# Patient Record
Sex: MALE | Race: AMERICAN INDIAN OR ALASKA NATIVE
[De-identification: names, ages, dates, MRNs, and addresses within clinical notes are randomized per-mention and may not be internally consistent; named-entity substitution may affect disease eponyms.]

---

## 2022-01-04 ENCOUNTER — HOSPITAL ENCOUNTER (EMERGENCY)
Dept: HOSPITAL 5 - ED | Age: 42
LOS: 1 days | Discharge: HOME | End: 2022-01-05
Payer: SELF-PAY

## 2022-01-04 DIAGNOSIS — I10: ICD-10-CM

## 2022-01-04 DIAGNOSIS — Z85.47: ICD-10-CM

## 2022-01-04 DIAGNOSIS — R07.89: ICD-10-CM

## 2022-01-04 DIAGNOSIS — G43.009: ICD-10-CM

## 2022-01-04 DIAGNOSIS — R42: Primary | ICD-10-CM

## 2022-01-04 DIAGNOSIS — E11.9: ICD-10-CM

## 2022-01-04 DIAGNOSIS — R11.2: ICD-10-CM

## 2022-01-04 DIAGNOSIS — R50.9: ICD-10-CM

## 2022-01-04 LAB
ALBUMIN SERPL-MCNC: 4.3 G/DL (ref 3.9–5)
ALT SERPL-CCNC: 34 UNITS/L (ref 7–56)
BASOPHILS # (AUTO): 0 K/MM3 (ref 0–0.1)
BASOPHILS NFR BLD AUTO: 0.9 % (ref 0–1.8)
BUN SERPL-MCNC: 13 MG/DL (ref 9–20)
BUN/CREAT SERPL: 14 %
CALCIUM SERPL-MCNC: 8.4 MG/DL (ref 8.4–10.2)
EOSINOPHIL # BLD AUTO: 0.1 K/MM3 (ref 0–0.4)
EOSINOPHIL NFR BLD AUTO: 1.8 % (ref 0–4.3)
HCT VFR BLD CALC: 43.9 % (ref 35.5–45.6)
HEMOLYSIS INDEX: 9
HGB BLD-MCNC: 14.2 GM/DL (ref 11.8–15.2)
LYMPHOCYTES # BLD AUTO: 0.9 K/MM3 (ref 1.2–5.4)
LYMPHOCYTES NFR BLD AUTO: 22.9 % (ref 13.4–35)
MCHC RBC AUTO-ENTMCNC: 32 % (ref 32–34)
MCV RBC AUTO: 87 FL (ref 84–94)
MONOCYTES # (AUTO): 0.5 K/MM3 (ref 0–0.8)
MONOCYTES % (AUTO): 13 % (ref 0–7.3)
PLATELET # BLD: 180 K/MM3 (ref 140–440)
RBC # BLD AUTO: 5.04 M/MM3 (ref 3.65–5.03)

## 2022-01-04 PROCEDURE — 96360 HYDRATION IV INFUSION INIT: CPT

## 2022-01-04 PROCEDURE — 99284 EMERGENCY DEPT VISIT MOD MDM: CPT

## 2022-01-04 PROCEDURE — 80053 COMPREHEN METABOLIC PANEL: CPT

## 2022-01-04 PROCEDURE — 84484 ASSAY OF TROPONIN QUANT: CPT

## 2022-01-04 PROCEDURE — 36415 COLL VENOUS BLD VENIPUNCTURE: CPT

## 2022-01-04 PROCEDURE — 71045 X-RAY EXAM CHEST 1 VIEW: CPT

## 2022-01-04 PROCEDURE — 93005 ELECTROCARDIOGRAM TRACING: CPT

## 2022-01-04 PROCEDURE — 83880 ASSAY OF NATRIURETIC PEPTIDE: CPT

## 2022-01-04 PROCEDURE — 85025 COMPLETE CBC W/AUTO DIFF WBC: CPT

## 2022-01-04 NOTE — EMERGENCY DEPARTMENT REPORT
ED General Adult HPI





- General


Chief complaint: Nausea/Vomiting/Diarrhea


Stated complaint: MEDICAL CLERANCE


PUI?: Yes


Source: patient


Mode of arrival: Ambulatory


Limitations: No Limitations





- History of Present Illness


Initial comments: 





Patient is a 41-year-old -American male with a history of hypertension, 

hyperlipidemia, testicular cancer in remission, and non-insulin-dependent 

diabetes who presented to the ED with complaint of acute onset persistent severe

headache, lightheadedness and dizziness, nausea and vomiting for the last 2 

days.  Patient states that he ran out of his medications about a week ago and 

has not been able to take his medications for hypertension and 

non-insulin-dependent diabetes as well as hyperlipidemia.  Patient also 

complains of right-sided chest pain which he describes as tightness.  Patient 

states that his symptoms have been worsening especially in the last 24 hours and

is To the ED for evaluation.  Patient denies syncope, palpitations, shortness of

breath, cough, sore throat, nasal and sinus congestion, abdominal pain, 

diarrhea, dysuria, urinary frequency and urgency or change in vision.


MD Complaint: dizziness, severe headache, generalized weakness, right-sided 

chest pain


-: Sudden, days(s) (2)


Location: head, chest


Radiation: non-radiation


Severity scale (0 -10): 3


Quality: aching, dull


Consistency: constant


Improves with: none


Worsens with: none


Associated Symptoms: denies other symptoms, chest pain (right-sided), 

fever/chills, headaches, loss of appetite, malaise, nausea/vomiting, weakness.  

denies: confusion, cough, diaphoresis, rash, seizure, shortness of breath, 

syncope


Treatments Prior to Arrival: none





- Related Data


                                  Previous Rx's











 Medication  Instructions  Recorded  Last Taken  Type


 


AtorvaSTATin [Lipitor] 20 mg PO QHS #30 tab 22 Unknown Rx


 


Butalb/Acetamin/Caff -40 1 - 2 tab PO Q6HR PRN #15 tab 22 Unknown Rx





[Fioricet -40]    


 


Ibuprofen [Motrin] 800 mg PO Q8HR PRN #30 tablet 22 Unknown Rx


 


Ondansetron [Zofran Odt] 4 mg PO Q6HR PRN #24 tab.rapdis 22 Unknown Rx


 


Spironolactone [Aldactone] 25 mg PO QDAY #30 tablet 22 Unknown Rx


 


carvediloL [Coreg] 25 mg PO BID #60 tablet 22 Unknown Rx


 


glipiZIDE [Glucotrol] 5 mg PO QDAY #30 tablet 22 Unknown Rx


 


metFORMIN [Glucophage] 850 mg PO BID #60 tablet 22 Unknown Rx











                                    Allergies











Allergy/AdvReac Type Severity Reaction Status Date / Time


 


No Known Allergies Allergy   Verified 22 22:10














ED Review of Systems


ROS: 


Stated complaint: MEDICAL CLERANCE


Other details as noted in HPI





Constitutional: chills, fever, malaise, weakness


Eyes: denies: eye pain, eye discharge, vision change


ENT: congestion.  denies: ear pain, throat pain


Respiratory: cough.  denies: shortness of breath, wheezing


Cardiovascular: chest pain (Right-sided).  denies: palpitations


Endocrine: no symptoms reported


Gastrointestinal: nausea, vomiting.  denies: abdominal pain, diarrhea


Genitourinary: denies: urgency, dysuria


Musculoskeletal: myalgia.  denies: back pain, joint swelling, arthralgia


Skin: denies: rash, lesions


Neurological: headache, weakness, other (Dizziness and lightheadedness).  

denies: paresthesias


Psychiatric: denies: anxiety, depression


Hematological/Lymphatic: denies: easy bleeding, easy bruising





ED Past Medical Hx





- Past Medical History


Hx Hypertension: Yes


Hx Diabetes: Yes


Hx of Cancer: Yes (testicular)





- Surgical History


Past Surgical History?: No





- Medications


Home Medications: 


                                Home Medications











 Medication  Instructions  Recorded  Confirmed  Last Taken  Type


 


AtorvaSTATin [Lipitor] 20 mg PO QHS #30 tab 22  Unknown Rx


 


Butalb/Acetamin/Caff -40 1 - 2 tab PO Q6HR PRN #15 tab 22  Unknown 

Rx





[Fioricet -40]     


 


Ibuprofen [Motrin] 800 mg PO Q8HR PRN #30 tablet 22  Unknown Rx


 


Ondansetron [Zofran Odt] 4 mg PO Q6HR PRN #24 tab.rapdis 22  Unknown Rx


 


Spironolactone [Aldactone] 25 mg PO QDAY #30 tablet 22  Unknown Rx


 


carvediloL [Coreg] 25 mg PO BID #60 tablet 22  Unknown Rx


 


glipiZIDE [Glucotrol] 5 mg PO QDAY #30 tablet 22  Unknown Rx


 


metFORMIN [Glucophage] 850 mg PO BID #60 tablet 22  Unknown Rx














ED Physical Exam





- General


Limitations: No Limitations


General appearance: alert, in no apparent distress





- Head


Head exam: Present: atraumatic, normocephalic, normal inspection





- Eye


Eye exam: Present: normal appearance, PERRL, EOMI





- ENT


ENT exam: Present: normal exam, normal orophraynx, mucous membranes moist, TM's 

normal bilaterally, normal external ear exam





- Neck


Neck exam: Present: normal inspection, full ROM





- Respiratory


Respiratory exam: Present: normal lung sounds bilaterally.  Absent: respiratory 

distress, wheezes, rales, chest wall tenderness, accessory muscle use, decreased

 breath sounds, prolonged expiratory





- Cardiovascular


Cardiovascular Exam: Present: normal rhythm, tachycardia, normal heart sounds.  

Absent: systolic murmur, diastolic murmur, rubs, gallop





- GI/Abdominal


GI/Abdominal exam: Present: soft, normal bowel sounds.  Absent: tenderness, 

guarding, rebound, hyperactive bowel sounds, hypoactive bowel sounds, 

organomegaly





- Extremities Exam


Extremities exam: Present: normal inspection, full ROM, normal capillary refill





- Back Exam


Back exam: Present: normal inspection, full ROM.  Absent: tenderness, CVA 

tenderness (R), CVA tenderness (L), muscle spasm, paraspinal tenderness, vertebr

al tenderness





- Neurological Exam


Neurological exam: Present: alert, oriented X3, CN II-XII intact, normal gait, 

reflexes normal





- Psychiatric


Psychiatric exam: Present: normal affect, normal mood





- Skin


Skin exam: Present: warm, dry, intact, normal color.  Absent: rash





ED Course


                                   Vital Signs











  22





  22:05


 


Temperature 100.0 F H


 


Pulse Rate 107 H


 


Respiratory 19





Rate 


 


Blood Pressure 152/100


 


O2 Sat by Pulse 98





Oximetry 














ED Medical Decision Making





- Lab Data


Result diagrams: 


                                 22 22:39





                                 22 22:39





- Radiology Data


Radiology results: report reviewed, image reviewed





[facility]  


                                     [address] [place] [address]  


 


                                            XRay Report   


                                               Signed  


 


Patient: MAGALIE ARNETT                                                           

     MR#: [id number]  


 


: 1980                                                                

Acct:[id number]      


 


Age/Sex: 41 / M                                                                

ADM Date: 22     


 


Loc: ED       


Attending Dr:   


 


 


Ordering Physician: DANIELLE SAVAGE  


Date of Service: 22  


Procedure(s): XR chest 1V ap  


Accession Number(s): [id number]  


 


cc: DANIELLE SAVAGE   


 


Fluoro Time In Minutes:   


 


CHEST 1 VIEW   


 


 INDICATION / CLINICAL INFORMATION:  


 chest pain.  


 


 COMPARISON:   


 None available.  


 


 FINDINGS:  


 


 SUPPORT DEVICES: None.  


 


 HEART / MEDIASTINUM: No significant abnormality.   


 


 LUNGS / PLEURA: No significant pulmonary or pleural abnormality. No 

pneumothorax.   


 


 ADDITIONAL FINDINGS: No significant additional findings.  


 


 IMPRESSION:  


 1. No acute findings.  


 


 Signer Name: Ivania Colin MD   


 Signed: 2022 10:48 PM  


 Workstation Name: [id number]   


 


 


Transcribed By:   


Dictated By: Ivania Colin MD  


Electronically Authenticated By: Ivania Colin MD    


Signed Date/Time: 22                                


 


 


 


DD/DT: 22                                                            

  


TD/TT:





























- Medical Decision Making





This is a 41-year-old -American male with a history of hypertension, h

yperlipidemia, testicular cancer in remission, and non-insulin-dependent 

diabetes who presented to the ED with complaint of acute onset persistent severe

 headache, lightheadedness and dizziness, nausea and vomiting for the last 2 

days.  Patient states that he ran out of his medications about a week ago and 

has not been able to take his medications for hypertension and 

non-insulin-dependent diabetes as well as hyperlipidemia.  Patient also 

complains of right-sided chest pain which he describes as tightness.  Patient 

states that his symptoms have been worsening especially in the last 24 hours and

 came to the ED for evaluation.  In the ED, patient is alert and oriented x3 and

 is not in any distress but tachycardic and febrile in triage.  Patient was 

treated for fever in the ED, also given antiemetics, normal saline 1 L IV bolus 

x1.  Chest x-ray showed no acute cardiopulmonary abnormalities or pneumonitis.  

Lab test results were reviewed and are all nonactionable including initial and 

3-hour repeat troponin levels.  EKG shows normal sinus rhythm with no ST or T 

wave abnormalities.  Patient's heart score is 3.  On reevaluation, patient felt 

better, patient was discharged home on medications and advised to follow-up with

 his primary care physician in 5 to 7 days for reevaluation.  Patient was 

advised return to the ED immediately if symptoms get worse.





- Differential Diagnosis


Dehydration; pneumonia; COVID-19; migraine headaches; hyperglycemia


Critical care attestation.: 


If time is entered above; I have spent that time in minutes in the direct care 

of this critically ill patient, excluding procedure time.








ED Disposition


Clinical Impression: 


 Dizziness and giddiness, Nausea and vomiting in adult patient, Fever and chills





Migraine headache without aura


Qualifiers:


 Status migrainosus presence: without status migrainosus Intractability: not 

intractable Qualified Code(s): G43.009 - Migraine without aura, not intractable,

 without status migrainosus





Disposition:  HOME / SELF CARE / HOMELESS


Is pt being admited?: No


Does the pt Need Aspirin: No


Condition: Stable


Instructions:  Nausea and Vomiting, Adult, Easy-to-Read, Migraine Headache, 

Easy-to-Read, Dizziness, Easy-to-Read, Fever, Adult, Easy-to-Read


Additional Instructions: 


All lab test results were reviewed and are all nonactionable.  Chest x-ray 

showed no acute cardiopulmonary abnormalities or pneumonitis.  Therefore take 

medication with food, drink plenty of fluids and follow-up with your primary 

care physician in 5 to 7 days for reevaluation.  Return to the ED immediately if

 symptoms get worse.


Prescriptions: 


AtorvaSTATin [Lipitor] 20 mg PO QHS #30 tab


Spironolactone [Aldactone] 25 mg PO QDAY #30 tablet


carvediloL [Coreg] 25 mg PO BID #60 tablet


Butalb/Acetamin/Caff -40 [Fioricet -40] 1 - 2 tab PO Q6HR PRN #15 

tab


 PRN Reason: Headache


metFORMIN [Glucophage] 850 mg PO BID #60 tablet


glipiZIDE [Glucotrol] 5 mg PO QDAY #30 tablet


Ibuprofen [Motrin] 800 mg PO Q8HR PRN #30 tablet


 PRN Reason: pain or fever


Ondansetron [Zofran Odt] 4 mg PO Q6HR PRN #24 tab.rapdis


 PRN Reason: Nausea


Referrals: 


DAE CALIXTO MD [Staff Physician] - 3-5 Days


Forms:  Work/School Release Form(ED)


Time of Disposition: 02:24


Print Language: ENGLISH

## 2022-01-04 NOTE — XRAY REPORT
CHEST 1 VIEW 



INDICATION / CLINICAL INFORMATION:

chest pain.



COMPARISON: 

None available.



FINDINGS:



SUPPORT DEVICES: None.



HEART / MEDIASTINUM: No significant abnormality. 



LUNGS / PLEURA: No significant pulmonary or pleural abnormality. No pneumothorax. 



ADDITIONAL FINDINGS: No significant additional findings.



IMPRESSION:

1. No acute findings.



Signer Name: Ivania Colin MD 

Signed: 1/4/2022 10:48 PM

Workstation Name: [id number]

## 2022-01-05 VITALS — DIASTOLIC BLOOD PRESSURE: 90 MMHG | SYSTOLIC BLOOD PRESSURE: 136 MMHG

## 2022-01-05 NOTE — ELECTROCARDIOGRAPH REPORT
[facility]

                                       

Test Date:    2022               Test Time:    22:28:20

Pat Name:     MAGALIE ARNETT             Department:   

Patient ID:   [id number]          Room:          

Gender:       M                        Technician:   

:          1980               Requested By: NIRU MCKINNON

Order Number: [id number]              Reading MD:   Gael Myles

                                 Measurements

Intervals                              Axis          

Rate:         99                       P:            38

RI:           153                      QRS:          29

QRSD:         96                       T:            13

QT:           390                                    

QTc:          500                                    

                           Interpretive Statements

Sinus rhythm

Probable anteroseptal infarct, old

No previous ECG available for comparison

Electronically Signed On 2022 13:22:55 EST by Gael Myles

## 2022-05-18 ENCOUNTER — HOSPITAL ENCOUNTER (EMERGENCY)
Dept: HOSPITAL 5 - ED | Age: 42
Discharge: HOME | End: 2022-05-18
Payer: SELF-PAY

## 2022-05-18 VITALS — DIASTOLIC BLOOD PRESSURE: 74 MMHG | SYSTOLIC BLOOD PRESSURE: 128 MMHG

## 2022-05-18 DIAGNOSIS — E11.9: ICD-10-CM

## 2022-05-18 DIAGNOSIS — I50.9: ICD-10-CM

## 2022-05-18 DIAGNOSIS — Z76.0: ICD-10-CM

## 2022-05-18 DIAGNOSIS — Z79.899: ICD-10-CM

## 2022-05-18 DIAGNOSIS — R07.89: Primary | ICD-10-CM

## 2022-05-18 DIAGNOSIS — I11.0: ICD-10-CM

## 2022-05-18 DIAGNOSIS — Z79.84: ICD-10-CM

## 2022-05-18 LAB
ALBUMIN SERPL-MCNC: 4.2 G/DL (ref 3.9–5)
ALT SERPL-CCNC: 49 UNITS/L (ref 7–56)
BUN SERPL-MCNC: 14 MG/DL (ref 9–20)
BUN/CREAT SERPL: 16 %
CALCIUM SERPL-MCNC: 9.3 MG/DL (ref 8.4–10.2)
HCT VFR BLD CALC: 44.3 % (ref 35.5–45.6)
HEMOLYSIS INDEX: 11
HGB BLD-MCNC: 14.8 GM/DL (ref 11.8–15.2)
MCHC RBC AUTO-ENTMCNC: 33 % (ref 32–34)
MCV RBC AUTO: 90 FL (ref 84–94)
PLATELET # BLD: 198 K/MM3 (ref 140–440)
RBC # BLD AUTO: 4.94 M/MM3 (ref 3.65–5.03)

## 2022-05-18 PROCEDURE — 99284 EMERGENCY DEPT VISIT MOD MDM: CPT

## 2022-05-18 PROCEDURE — 85027 COMPLETE CBC AUTOMATED: CPT

## 2022-05-18 PROCEDURE — 96372 THER/PROPH/DIAG INJ SC/IM: CPT

## 2022-05-18 PROCEDURE — 36415 COLL VENOUS BLD VENIPUNCTURE: CPT

## 2022-05-18 PROCEDURE — 93005 ELECTROCARDIOGRAM TRACING: CPT

## 2022-05-18 PROCEDURE — 82962 GLUCOSE BLOOD TEST: CPT

## 2022-05-18 PROCEDURE — 84484 ASSAY OF TROPONIN QUANT: CPT

## 2022-05-18 PROCEDURE — 80053 COMPREHEN METABOLIC PANEL: CPT

## 2022-05-18 PROCEDURE — 71046 X-RAY EXAM CHEST 2 VIEWS: CPT

## 2022-05-18 PROCEDURE — 99283 EMERGENCY DEPT VISIT LOW MDM: CPT

## 2022-05-18 NOTE — XRAY REPORT
CHEST PA AND LATERAL VIEWS



INDICATION: 

chest pain.



COMPARISON: 

1/4/2022



FINDINGS:



Support devices: None.

Heart: Within normal limits. 

Lungs/Pleura: No acute pulmonary or pleural findings.  







IMPRESSION:

1. No acute findings.



Signer Name: Pedro Pablo Claros MD 

Signed: 5/18/2022 6:17 PM

Workstation Name: [id number]

## 2022-05-18 NOTE — EMERGENCY DEPARTMENT REPORT
ED Chest Pain HPI





- General


Chief Complaint: Chest Pain


Stated Complaint: CP/DIABETIC


Time Seen by Provider: 05/18/22 17:46


Source: patient


Mode of arrival: Ambulatory


Limitations: No Limitations





- History of Present Illness


Initial Comments: 





42-year-old black male with a past medical history of diabetes, hyperlipidemia, 

CHF, and hypertension presents to the emergency department for evaluation of 1 

week history of chest pain.  He states that chest pain is generally midsternal 

and nonradiating.  He states that pain is associated with dizziness and nausea 

but he denies shortness of breath vomiting and diaphoresis.  He states that pain

is worse is 8 out of 10 and worse with palpation and movement.  He is also 

requesting refill of his home medications.


MD Complaint: chest pain


-: Gradual, week(s)


Onset: during rest (1)


Pain Location: substernal


Pain Radiation: none


Severity: moderate


Severity scale (0 -10): 8


Quality: aching


Consistency: intermittent


Worsens With: palpation, movement


re: nausea.  denies: vomting, diaphoresis, dyspnea, sense of impending doom


Other Symptoms: denies: cough, fever, syncope, rash, acid taste in mouth, leg 

swelling, palpitations, burping


Treatments Prior to Arrival: none


Aspirin use within the Past 7 Days: (0) No





- Related Data


                                  Previous Rx's











 Medication  Instructions  Recorded  Last Taken  Type


 


AtorvaSTATin [Lipitor] 20 mg PO QHS #30 tab 01/05/22 Unknown Rx


 


Butalb/Acetamin/Caff -40 1 - 2 tab PO Q6HR PRN #15 tab 01/05/22 Unknown Rx





[Fioricet -40]    


 


Ibuprofen [Motrin] 800 mg PO Q8HR PRN #30 tablet 01/05/22 Unknown Rx


 


Ondansetron [Zofran Odt] 4 mg PO Q6HR PRN #24 tab.rapdis 01/05/22 Unknown Rx


 


Spironolactone [Aldactone] 25 mg PO QDAY #30 tablet 01/05/22 Unknown Rx


 


carvediloL [Coreg] 25 mg PO BID #60 tablet 01/05/22 Unknown Rx


 


glipiZIDE [Glucotrol] 5 mg PO QDAY #30 tablet 01/05/22 Unknown Rx


 


metFORMIN [Glucophage] 850 mg PO BID #60 tablet 01/05/22 Unknown Rx


 


AtorvaSTATin [Lipitor] 20 mg PO QHS #30 tab 05/18/22 Unknown Rx


 


Spironolactone [Aldactone] 25 mg PO QDAY #30 tablet 05/18/22 Unknown Rx


 


carvediloL [Coreg] 25 mg PO BID #60 tablet 05/18/22 Unknown Rx


 


glipiZIDE [Glucotrol] 5 mg PO QDAY #30 tablet 05/18/22 Unknown Rx


 


metFORMIN [Glucophage] 850 mg PO BID #60 tab 05/18/22 Unknown Rx











                                    Allergies











Allergy/AdvReac Type Severity Reaction Status Date / Time


 


No Known Allergies Allergy   Verified 01/04/22 22:10














Heart Score





- HEART Score


History: Slightly suspicious


EKG: Normal


Age: < 45


Risk factors: > 3 risk factors or hx of atherosclerotic disease


Troponin: < normal limit


HEART Score: 2





- EKG Read Time


Time EKG Completed: 11:29


EKG Read Time: 11:33





- Critical Actions


Critical Actions: 0-3 pts:0.9-1.7%risk of adverse cardiac event.Candidate for 

discharge





ED Review of Systems


ROS: 


Stated complaint: CP/DIABETIC


Other details as noted in HPI





Comment: All other systems reviewed and negative


Constitutional: denies: chills, diaphoresis, fever, malaise, weakness


Eyes: denies: eye pain, eye discharge


ENT: denies: dental pain, congestion


Respiratory: denies: cough, orthopnea, shortness of breath, SOB with exertion, 

SOB at rest, stridor, wheezing


Cardiovascular: chest pain.  denies: palpitations, dyspnea on exertion, 

orthopnea, edema, syncope, paroxysmal nocturnal dyspnea


Gastrointestinal: nausea.  denies: abdominal pain, vomiting, diarrhea, 

hematemesis, melena, hematochezia


Genitourinary: denies: urgency, dysuria, frequency, hematuria, discharge


Musculoskeletal: denies: back pain


Skin: denies: rash, lesions


Neurological: denies: headache, weakness, numbness, paresthesias, confusion, 

abnormal gait, vertigo


Psychiatric: denies: anxiety


Hematological/Lymphatic: denies: easy bleeding, easy bruising





ED Past Medical Hx





- Past Medical History


Hx Hypertension: Yes


Hx Diabetes: Yes





- Medications


Home Medications: 


                                Home Medications











 Medication  Instructions  Recorded  Confirmed  Last Taken  Type


 


AtorvaSTATin [Lipitor] 20 mg PO QHS #30 tab 01/05/22  Unknown Rx


 


Butalb/Acetamin/Caff -40 1 - 2 tab PO Q6HR PRN #15 tab 01/05/22  Unknown 

Rx





[Fioricet -40]     


 


Ibuprofen [Motrin] 800 mg PO Q8HR PRN #30 tablet 01/05/22  Unknown Rx


 


Ondansetron [Zofran Odt] 4 mg PO Q6HR PRN #24 tab.rapdis 01/05/22  Unknown Rx


 


Spironolactone [Aldactone] 25 mg PO QDAY #30 tablet 01/05/22  Unknown Rx


 


carvediloL [Coreg] 25 mg PO BID #60 tablet 01/05/22  Unknown Rx


 


glipiZIDE [Glucotrol] 5 mg PO QDAY #30 tablet 01/05/22  Unknown Rx


 


metFORMIN [Glucophage] 850 mg PO BID #60 tablet 01/05/22  Unknown Rx


 


AtorvaSTATin [Lipitor] 20 mg PO QHS #30 tab 05/18/22  Unknown Rx


 


Spironolactone [Aldactone] 25 mg PO QDAY #30 tablet 05/18/22  Unknown Rx


 


carvediloL [Coreg] 25 mg PO BID #60 tablet 05/18/22  Unknown Rx


 


glipiZIDE [Glucotrol] 5 mg PO QDAY #30 tablet 05/18/22  Unknown Rx


 


metFORMIN [Glucophage] 850 mg PO BID #60 tab 05/18/22  Unknown Rx














ED Physical Exam





- General


Limitations: No Limitations


General appearance: alert, in no apparent distress





- Head


Head exam: Present: atraumatic, normocephalic





- Eye


Eye exam: Present: normal appearance.  Absent: conjunctival injection





- Neck


Neck exam: Present: normal inspection, full ROM.  Absent: tenderness, 

meningismus, lymphadenopathy





- Respiratory


Respiratory exam: Present: normal lung sounds bilaterally, chest wall t

enderness.  Absent: respiratory distress, wheezes, rales, rhonchi, stridor





- Cardiovascular


Cardiovascular Exam: Present: regular rate, normal heart sounds





- GI/Abdominal


GI/Abdominal exam: Present: soft, normal bowel sounds.  Absent: distended, 

tenderness, guarding, rebound, rigid





- Extremities Exam


Extremities exam: Present: normal inspection, full ROM, normal capillary refill.

  Absent: pedal edema, joint swelling, calf tenderness





- Back Exam


Back exam: Present: normal inspection.  Absent: CVA tenderness (R), CVA 

tenderness (L), vertebral tenderness





- Neurological Exam


Neurological exam: Present: alert, oriented X3, normal gait





- Psychiatric


Psychiatric exam: Present: normal affect, normal mood





- Skin


Skin exam: Present: warm, dry, intact, normal color





ED Course


                                   Vital Signs











  05/18/22 05/18/22





  11:15 20:01


 


Temperature 98.2 F 98.5 F


 


Pulse Rate 85 77


 


Respiratory 18 20





Rate  


 


Blood Pressure 162/114 128/74





[Right]  


 


O2 Sat by Pulse 99 100





Oximetry  














ED Medical Decision Making





- Lab Data


Result diagrams: 


                                 05/18/22 18:04





                                 05/18/22 18:04





- EKG Data


EKG shows normal: sinus rhythm


Rate: normal





- EKG Data


Interpretation: no acute changes





- Radiology Data


Radiology results: report reviewed, image reviewed





Chest x-ray:


 FINDINGS:  


 


 Support devices: None.  


 Heart: Within normal limits.   


 Lungs/Pleura: No acute pulmonary or pleural findings.    


 


 


 


 IMPRESSION:  


 1. No acute findings. 





- Medical Decision Making





42-year-old black male with a past medical history of diabetes, hyperlipidemia, 

CHF, and hypertension presents to the emergency department for evaluation of 1 

week history of chest pain.  He states that chest pain is generally midsternal 

and nonradiating.  He states that pain is associated with dizziness and nausea 

but he denies shortness of breath vomiting and diaphoresis.  He states that pain

 is worse is 8 out of 10 and worse with palpation and movement.  He is also 

requesting refill of his home medications.





EKG without any acute ischemic changes noted, troponin within normal limits, 

chest x-ray without any acute abnormalities noted, and heart score of 2.  Pain 

worse with palpation and movement but was improved after medication.  Low 

suspicion for ACS.  Symptoms and exam most consistent with chest wall 

tenderness.  Patient was given refill of home medication and is advised to 

continue his home meds as previously prescribed.  He is advised to follow-up 

with cardiology for further evaluation and management.  He is advised to return 

to the emergency department for any concerning symptoms.  He verbalized 

understanding of and agreement with plan of care.


Critical care attestation.: 


If time is entered above; I have spent that time in minutes in the direct care 

of this critically ill patient, excluding procedure time.








ED Disposition


Clinical Impression: 


 Chest wall pain, Medication refill





Disposition: 01 HOME / SELF CARE / HOMELESS


Is pt being admited?: No


Does the pt Need Aspirin: No


Condition: Stable


Instructions:  Chest Wall Pain, Easy-to-Read, Nonspecific Chest Pain, Adult


Additional Instructions: 


Take medications as prescribed.  Follow-up with primary care provider or 

cardiologist for further evaluation and management.  Return to the emergency 

department as needed.


Prescriptions: 


AtorvaSTATin [Lipitor] 20 mg PO QHS #30 tab


Spironolactone [Aldactone] 25 mg PO QDAY #30 tablet


carvediloL [Coreg] 25 mg PO BID #60 tablet


metFORMIN [Glucophage] 850 mg PO BID #60 tab


glipiZIDE [Glucotrol] 5 mg PO QDAY #30 tablet


Referrals: 


DIAN BERGER MD [Staff Physician] - 3-5 Days


KEYSHAWN PEREZ MD [Staff Physician] - 3-5 Days


Forms:  Work/School Release Form(ED)


Time of Disposition: 19:21

## 2022-05-20 NOTE — ELECTROCARDIOGRAPH REPORT
[facility]

                                       

Test Date:    2022               Test Time:    11:29:09

Pat Name:     MAGALIE ARNETT             Department:   

Patient ID:   [id number]          Room:          

Gender:       M                        Technician:   AF

:          1980               Requested By: PATRIC ALVARES

Order Number: [id number]              Reading MD:   Jairo Nur

                                 Measurements

Intervals                              Axis          

Rate:         82                       P:            45

NE:           160                      QRS:          17

QRSD:         94                       T:            -5

QT:           379                                    

QTc:          444                                    

                           Interpretive Statements

Sinus rhythm

Anteroseptal infarct, old

Compared to ECG 2022 22:28:20

No significant changes

Electronically Signed On 2022 9:32:16 EDT by Jairo Nur

## 2022-08-30 ENCOUNTER — HOSPITAL ENCOUNTER (EMERGENCY)
Dept: HOSPITAL 5 - ED | Age: 42
Discharge: HOME | End: 2022-08-30
Payer: SELF-PAY

## 2022-08-30 VITALS — SYSTOLIC BLOOD PRESSURE: 163 MMHG | DIASTOLIC BLOOD PRESSURE: 98 MMHG

## 2022-08-30 DIAGNOSIS — Z79.899: ICD-10-CM

## 2022-08-30 DIAGNOSIS — E11.65: ICD-10-CM

## 2022-08-30 DIAGNOSIS — Z79.82: ICD-10-CM

## 2022-08-30 DIAGNOSIS — R07.89: Primary | ICD-10-CM

## 2022-08-30 LAB
ALBUMIN SERPL-MCNC: 4.6 G/DL (ref 3.9–5)
ALT SERPL-CCNC: 57 UNITS/L (ref 7–56)
BASOPHILS # (AUTO): 0.1 K/MM3 (ref 0–0.1)
BASOPHILS NFR BLD AUTO: 1.1 % (ref 0–1.8)
BUN SERPL-MCNC: 11 MG/DL (ref 9–20)
BUN/CREAT SERPL: 12 %
CALCIUM SERPL-MCNC: 9.3 MG/DL (ref 8.4–10.2)
EOSINOPHIL # BLD AUTO: 0.2 K/MM3 (ref 0–0.4)
EOSINOPHIL NFR BLD AUTO: 3.3 % (ref 0–4.3)
HCT VFR BLD CALC: 46 % (ref 35.5–45.6)
HEMOLYSIS INDEX: 23
HGB BLD-MCNC: 15.9 GM/DL (ref 11.8–15.2)
LYMPHOCYTES # BLD AUTO: 1.8 K/MM3 (ref 1.2–5.4)
LYMPHOCYTES NFR BLD AUTO: 23.4 % (ref 13.4–35)
MCHC RBC AUTO-ENTMCNC: 35 % (ref 32–34)
MCV RBC AUTO: 88 FL (ref 84–94)
MONOCYTES # (AUTO): 0.5 K/MM3 (ref 0–0.8)
MONOCYTES % (AUTO): 6.6 % (ref 0–7.3)
MUCOUS THREADS #/AREA URNS HPF: (no result) /HPF
PLATELET # BLD: 202 K/MM3 (ref 140–440)
RBC # BLD AUTO: 5.2 M/MM3 (ref 3.65–5.03)
RBC #/AREA URNS HPF: 1 /HPF (ref 0–6)
WBC #/AREA URNS HPF: < 1 /HPF (ref 0–6)

## 2022-08-30 PROCEDURE — 80053 COMPREHEN METABOLIC PANEL: CPT

## 2022-08-30 PROCEDURE — 71046 X-RAY EXAM CHEST 2 VIEWS: CPT

## 2022-08-30 PROCEDURE — 84484 ASSAY OF TROPONIN QUANT: CPT

## 2022-08-30 PROCEDURE — 82010 KETONE BODYS QUAN: CPT

## 2022-08-30 PROCEDURE — 82962 GLUCOSE BLOOD TEST: CPT

## 2022-08-30 PROCEDURE — 36415 COLL VENOUS BLD VENIPUNCTURE: CPT

## 2022-08-30 PROCEDURE — 96360 HYDRATION IV INFUSION INIT: CPT

## 2022-08-30 PROCEDURE — 99284 EMERGENCY DEPT VISIT MOD MDM: CPT

## 2022-08-30 PROCEDURE — 85025 COMPLETE CBC W/AUTO DIFF WBC: CPT

## 2022-08-30 PROCEDURE — 96361 HYDRATE IV INFUSION ADD-ON: CPT

## 2022-08-30 PROCEDURE — 93005 ELECTROCARDIOGRAM TRACING: CPT

## 2022-08-30 PROCEDURE — 82805 BLOOD GASES W/O2 SATURATION: CPT

## 2022-08-30 PROCEDURE — 81001 URINALYSIS AUTO W/SCOPE: CPT

## 2022-08-30 NOTE — EMERGENCY DEPARTMENT REPORT
ED Chest Pain HPI





- General


Chief Complaint: Chest Pain


Stated Complaint: HIGH SUGAR, CHEST PAIN


Time Seen by Provider: 08/30/22 14:31


Source: patient





- History of Present Illness


Initial Comments: 





Patient is a 42-year-old male presents emergency department with complaints of 

chest pain.  Patient reports that he had been out with both blood pressure and 

blood sugar medications for the past 2 months.  He is unsure why he is to be 

taking.  He reports past medical history of hypertension, diabetes, CHF.  He 

reports that his chest pain started while he was working on Saturday.  He has 

sharp pain on the right that radiates to the shoulder.  He states the pain is 

does not radiate past this.  He states the pain on Saturday was 10 out of 10 and

currently is 7 out of 10.  States the pain stays on the right side.  Patient 

reports no history of blood clots in legs or lungs but states that he is a 

smoker.  Patient has not had any aspirin but did take a dose of Tylenol on 

yesterday.


Severity scale (0 -10): 7





- Related Data


                                  Previous Rx's











 Medication  Instructions  Recorded  Last Taken  Type


 


AtorvaSTATin [Lipitor] 20 mg PO QHS #30 tab 01/05/22 Unknown Rx


 


Butalb/Acetamin/Caff -40 1 - 2 tab PO Q6HR PRN #15 tab 01/05/22 Unknown Rx





[Fioricet -40]    


 


Ibuprofen [Motrin] 800 mg PO Q8HR PRN #30 tablet 01/05/22 Unknown Rx


 


Ondansetron [Zofran Odt] 4 mg PO Q6HR PRN #24 tab.rapdis 01/05/22 Unknown Rx


 


Spironolactone [Aldactone] 25 mg PO QDAY #30 tablet 01/05/22 Unknown Rx


 


carvediloL [Coreg] 25 mg PO BID #60 tablet 01/05/22 Unknown Rx


 


glipiZIDE [Glucotrol] 5 mg PO QDAY #30 tablet 01/05/22 Unknown Rx


 


metFORMIN [Glucophage] 850 mg PO BID #60 tablet 01/05/22 Unknown Rx


 


AtorvaSTATin [Lipitor] 20 mg PO QHS #30 tab 05/18/22 Unknown Rx


 


Spironolactone [Aldactone] 25 mg PO QDAY #30 tablet 05/18/22 Unknown Rx


 


carvediloL [Coreg] 25 mg PO BID #60 tablet 05/18/22 Unknown Rx


 


glipiZIDE [Glucotrol] 5 mg PO QDAY #30 tablet 05/18/22 Unknown Rx


 


metFORMIN [Glucophage] 850 mg PO BID #60 tab 05/18/22 Unknown Rx


 


Aspirin EC [Halfprin EC] 81 mg PO QDAY 30 Days #30 tablet. 08/30/22 Unknown Rx


 


AtorvaSTATin [Lipitor] 20 mg PO QHS 30 Days #30 tab 08/30/22 Unknown Rx


 


carvediloL [Coreg] 25 mg PO BID 30 Days #60 tablet 08/30/22 Unknown Rx


 


metFORMIN [Glucophage] 500 mg PO BID 30 Days #60 tab 08/30/22 Unknown Rx











                                    Allergies











Allergy/AdvReac Type Severity Reaction Status Date / Time


 


No Known Allergies Allergy   Verified 01/04/22 22:10














Heart Score





- HEART Score


History: Slightly suspicious


EKG: Non-specific


Age: < 45


Risk factors: > 3 risk factors or hx of atherosclerotic disease


Troponin: < normal limit


HEART Score: 3





- EKG Read Time


Time EKG Completed: 18:13


EKG Read Time: 18:17





ED Review of Systems


ROS: 


Stated complaint: HIGH SUGAR, CHEST PAIN


Other details as noted in HPI





Constitutional: denies: chills, fever


Eyes: denies: eye pain, eye discharge, vision change


ENT: denies: ear pain, throat pain


Respiratory: shortness of breath.  denies: cough, wheezing


Cardiovascular: chest pain.  denies: palpitations


Endocrine: no symptoms reported


Gastrointestinal: denies: abdominal pain, nausea, diarrhea


Genitourinary: denies: urgency, dysuria


Musculoskeletal: denies: back pain, joint swelling, arthralgia


Skin: denies: rash, lesions


Neurological: denies: headache, weakness, paresthesias


Psychiatric: denies: anxiety, depression


Hematological/Lymphatic: denies: easy bleeding, easy bruising





ED Past Medical Hx





- Past Medical History


Hx Hypertension: Yes


Hx Diabetes: Yes





- Medications


Home Medications: 


                                Home Medications











 Medication  Instructions  Recorded  Confirmed  Last Taken  Type


 


AtorvaSTATin [Lipitor] 20 mg PO QHS #30 tab 01/05/22  Unknown Rx


 


Butalb/Acetamin/Caff -40 1 - 2 tab PO Q6HR PRN #15 tab 01/05/22  Unknown 

Rx





[Fioricet -40]     


 


Ibuprofen [Motrin] 800 mg PO Q8HR PRN #30 tablet 01/05/22  Unknown Rx


 


Ondansetron [Zofran Odt] 4 mg PO Q6HR PRN #24 tab.rapdis 01/05/22  Unknown Rx


 


Spironolactone [Aldactone] 25 mg PO QDAY #30 tablet 01/05/22  Unknown Rx


 


carvediloL [Coreg] 25 mg PO BID #60 tablet 01/05/22  Unknown Rx


 


glipiZIDE [Glucotrol] 5 mg PO QDAY #30 tablet 01/05/22  Unknown Rx


 


metFORMIN [Glucophage] 850 mg PO BID #60 tablet 01/05/22  Unknown Rx


 


AtorvaSTATin [Lipitor] 20 mg PO QHS #30 tab 05/18/22  Unknown Rx


 


Spironolactone [Aldactone] 25 mg PO QDAY #30 tablet 05/18/22  Unknown Rx


 


carvediloL [Coreg] 25 mg PO BID #60 tablet 05/18/22  Unknown Rx


 


glipiZIDE [Glucotrol] 5 mg PO QDAY #30 tablet 05/18/22  Unknown Rx


 


metFORMIN [Glucophage] 850 mg PO BID #60 tab 05/18/22  Unknown Rx


 


Aspirin EC [Halfprin EC] 81 mg PO QDAY 30 Days #30 tablet. 08/30/22  Unknown 

Rx


 


AtorvaSTATin [Lipitor] 20 mg PO QHS 30 Days #30 tab 08/30/22  Unknown Rx


 


carvediloL [Coreg] 25 mg PO BID 30 Days #60 tablet 08/30/22  Unknown Rx


 


metFORMIN [Glucophage] 500 mg PO BID 30 Days #60 tab 08/30/22  Unknown Rx














ED Physical Exam





- General


General appearance: alert, in no apparent distress





- Head


Head exam: Present: atraumatic, normocephalic





- Eye


Eye exam: Present: normal appearance





- ENT


ENT exam: Present: mucous membranes moist





- Neck


Neck exam: Present: normal inspection





- Respiratory


Respiratory exam: Present: normal lung sounds bilaterally.  Absent: respiratory 

distress





- Cardiovascular


Cardiovascular Exam: Present: regular rate, normal rhythm.  Absent: systolic 

murmur, diastolic murmur, rubs, gallop





- GI/Abdominal


GI/Abdominal exam: Present: soft, normal bowel sounds.  Absent: tenderness





- Rectal


Rectal exam: Present: deferred





- Extremities Exam


Extremities exam: Present: normal inspection





- Back Exam


Back exam: Present: normal inspection





- Neurological Exam


Neurological exam: Present: alert, oriented X3





- Psychiatric


Psychiatric exam: Present: normal affect, normal mood





- Skin


Skin exam: Present: warm, dry, intact, normal color.  Absent: rash





ED Course


                                   Vital Signs











  08/30/22 08/30/22 08/30/22





  16:37 19:32 20:28


 


Temperature 98.0 F  


 


Pulse Rate 72 68 71


 


Respiratory 18  12





Rate   


 


Blood Pressure  160/110 


 


Blood Pressure 169/117  153/108





[Left]   


 


O2 Sat by Pulse 99  100





Oximetry   














- Reevaluation(s)


Reevaluation #1: 





08/30/22 18:49


Heart score is 3.  Given this will likely give aspirin nitro IV fluids reassess 

and refill patient's meds.  We are pending patient's repeat troponin.


08/30/22 20:52


Patient feels improved.  Blood sugar is now 198.  Plan for discharge patient 

heart score is 3.  I have refilled patient's medications for hypertension, 

diabetes and have placed patient on aspirin and statin.





HERMELINDO score





- Hermelindo Score


Aspirin use within the Past 7 Days: (0) No





ED Medical Decision Making





- Lab Data


Result diagrams: 


                                 08/30/22 15:22





                                 08/30/22 15:22





- EKG Data


-: EKG Interpreted by Me


EKG shows normal: sinus rhythm


Rate: normal





- EKG Data


Interpretation: nonspecific ST-T wave mandie, other (TWI at lead III)





- Radiology Data


Radiology results: report reviewed, image reviewed





- Medical Decision Making





Patient is a 42-year-old male who presents to the emergency department with 

complaints of chest pain.  Patient has history of hypertension, diabetes and is 

a smoker.  He denies any family history of cardiac disease.  I am concerned for 

ACS.  Also concern if aortic dissection although this is less likely than 

pulmonary embolism.  This is also less likely given patient is not tachycardic 

and has no hypoxia.  Plan to treat patient's pain, give IV fluids for 

hyperglycemia and reassess.


Critical care attestation.: 


If time is entered above; I have spent that time in minutes in the direct care 

of this critically ill patient, excluding procedure time.








ED Disposition


Clinical Impression: 


 Chest pain, Hyperglycemia





Disposition: 01 HOME / SELF CARE / HOMELESS


Is pt being admited?: No


Does the pt Need Aspirin: Yes


Condition: Stable


Instructions:  Nonspecific Chest Pain, Adult, Hyperglycemia


Additional Instructions: 


Return to ED if symptoms worsen.


Prescriptions: 


AtorvaSTATin [Lipitor] 20 mg PO QHS 30 Days #30 tab


carvediloL [Coreg] 25 mg PO BID 30 Days #60 tablet


metFORMIN [Glucophage] 500 mg PO BID 30 Days #60 tab


Aspirin EC [Halfprin EC] 81 mg PO QDAY 30 Days #30 tablet.dr


Referrals: 


KEYSHAWN PEREZ MD [Primary Care Provider] - 3-5 Days


TRAVIS MORAN MD [Staff Physician] - 7-10 days (Cardiology)


Time of Disposition: 20:51


Print Language: ENGLISH

## 2022-08-30 NOTE — XRAY REPORT
CHEST 2 VIEWS 



INDICATION / CLINICAL INFORMATION:

Chest Pain.



COMPARISON: 

5/18/2022



FINDINGS:



SUPPORT DEVICES: None.



HEART / MEDIASTINUM: No significant abnormality. 



LUNGS / PLEURA: No significant pulmonary or pleural abnormality. No pneumothorax. 



ADDITIONAL FINDINGS: No significant additional findings.



IMPRESSION:

1. No acute findings.



Signer Name: Alejandro Gleason Jr, MD 

Signed: 8/30/2022 2:45 PM

Workstation Name: [id number]

## 2022-08-31 NOTE — ELECTROCARDIOGRAPH REPORT
[facility]

                                       

Test Date:    2022               Test Time:    18:13:53

Pat Name:     MAGALIE ARNETT             Department:   

Patient ID:   [id number]          Room:          

Gender:       M                        Technician:   ANGEL

:          1980               Requested By: OLE YAN

Order Number: [id number]             Reading MD:   Gael Myles

                                 Measurements

Intervals                              Axis          

Rate:         75                       P:            63

WV:           162                      QRS:          29

QRSD:         91                       T:            -2

QT:           409                                    

QTc:          456                                    

                           Interpretive Statements

Sinus rhythm

Probable anteroseptal infarct, old

Compared to ECG 2022 11:29:09

No significant changes

Electronically Signed On 2022 18:09:26 EDT by Gael Myles

## 2022-12-22 ENCOUNTER — OFFICE VISIT (OUTPATIENT)
Dept: URBAN - METROPOLITAN AREA CLINIC 118 | Facility: CLINIC | Age: 42
End: 2022-12-22

## 2023-02-27 ENCOUNTER — OFFICE VISIT (OUTPATIENT)
Dept: URBAN - METROPOLITAN AREA CLINIC 118 | Facility: CLINIC | Age: 43
End: 2023-02-27